# Patient Record
Sex: MALE | Race: WHITE | NOT HISPANIC OR LATINO | Employment: OTHER | URBAN - METROPOLITAN AREA
[De-identification: names, ages, dates, MRNs, and addresses within clinical notes are randomized per-mention and may not be internally consistent; named-entity substitution may affect disease eponyms.]

---

## 2017-01-10 ENCOUNTER — HOSPITAL ENCOUNTER (OUTPATIENT)
Dept: CARDIOLOGY | Facility: HOSPITAL | Age: 77
Discharge: HOME | End: 2017-01-10
Attending: INTERNAL MEDICINE | Admitting: INTERNAL MEDICINE

## 2019-07-26 RX ORDER — METOPROLOL SUCCINATE 25 MG/1
TABLET, EXTENDED RELEASE ORAL
Qty: 90 TABLET | Refills: 2 | Status: SHIPPED | OUTPATIENT
Start: 2019-07-26 | End: 2020-04-24

## 2019-07-26 RX ORDER — RAMIPRIL 10 MG/1
CAPSULE ORAL
Qty: 90 CAPSULE | Refills: 2 | Status: SHIPPED | OUTPATIENT
Start: 2019-07-26 | End: 2020-03-16

## 2019-08-23 RX ORDER — ROSUVASTATIN CALCIUM 10 MG/1
TABLET, COATED ORAL
COMMUNITY
Start: 2015-06-30

## 2019-08-23 RX ORDER — ISOSORBIDE MONONITRATE 30 MG/1
TABLET, EXTENDED RELEASE ORAL EVERY 24 HOURS
COMMUNITY
Start: 2017-12-06

## 2019-08-23 RX ORDER — LEVOTHYROXINE SODIUM 20 UG/ML
INJECTION, SOLUTION INTRAVENOUS
COMMUNITY
Start: 2014-04-01 | End: 2021-08-31

## 2019-08-23 RX ORDER — AMPICILLIN TRIHYDRATE 250 MG
CAPSULE ORAL
COMMUNITY
Start: 2017-01-05

## 2019-08-30 ENCOUNTER — OFFICE VISIT (OUTPATIENT)
Dept: CARDIOLOGY | Facility: CLINIC | Age: 79
End: 2019-08-30

## 2019-08-30 VITALS
OXYGEN SATURATION: 99 % | DIASTOLIC BLOOD PRESSURE: 80 MMHG | BODY MASS INDEX: 25.43 KG/M2 | HEART RATE: 72 BPM | HEIGHT: 67 IN | SYSTOLIC BLOOD PRESSURE: 125 MMHG | WEIGHT: 162 LBS

## 2019-08-30 DIAGNOSIS — I10 ESSENTIAL HYPERTENSION: ICD-10-CM

## 2019-08-30 DIAGNOSIS — E78.5 DYSLIPIDEMIA: Primary | ICD-10-CM

## 2019-08-30 DIAGNOSIS — R07.89 CHEST PAIN, ATYPICAL: ICD-10-CM

## 2019-08-30 DIAGNOSIS — I25.10 CAD S/P PERCUTANEOUS CORONARY ANGIOPLASTY: ICD-10-CM

## 2019-08-30 DIAGNOSIS — Z98.61 CAD S/P PERCUTANEOUS CORONARY ANGIOPLASTY: ICD-10-CM

## 2019-08-30 DIAGNOSIS — R74.8 ELEVATED CPK: ICD-10-CM

## 2019-08-30 PROCEDURE — 93000 ELECTROCARDIOGRAM COMPLETE: CPT | Performed by: INTERNAL MEDICINE

## 2019-08-30 PROCEDURE — 99214 OFFICE O/P EST MOD 30 MIN: CPT | Performed by: INTERNAL MEDICINE

## 2019-08-30 RX ORDER — ZOSTER VACCINE RECOMBINANT, ADJUVANTED 50 MCG/0.5
KIT INTRAMUSCULAR
COMMUNITY
Start: 2019-08-22 | End: 2021-08-31

## 2019-08-30 RX ORDER — INFLUENZA VACCINE, ADJUVANTED 15; 15; 15 UG/.5ML; UG/.5ML; UG/.5ML
INJECTION, SUSPENSION INTRAMUSCULAR
Refills: 0 | COMMUNITY
Start: 2019-08-24 | End: 2021-08-31

## 2019-08-30 RX ORDER — PNEUMOCOCCAL VACCINE POLYVALENT 25; 25; 25; 25; 25; 25; 25; 25; 25; 25; 25; 25; 25; 25; 25; 25; 25; 25; 25; 25; 25; 25; 25 UG/.5ML; UG/.5ML; UG/.5ML; UG/.5ML; UG/.5ML; UG/.5ML; UG/.5ML; UG/.5ML; UG/.5ML; UG/.5ML; UG/.5ML; UG/.5ML; UG/.5ML; UG/.5ML; UG/.5ML; UG/.5ML; UG/.5ML; UG/.5ML; UG/.5ML; UG/.5ML; UG/.5ML; UG/.5ML; UG/.5ML
INJECTION, SOLUTION INTRAMUSCULAR; SUBCUTANEOUS
Refills: 0 | COMMUNITY
Start: 2019-08-24 | End: 2021-08-31

## 2020-03-16 RX ORDER — RAMIPRIL 10 MG/1
CAPSULE ORAL
Qty: 90 CAPSULE | Refills: 1 | Status: SHIPPED | OUTPATIENT
Start: 2020-03-16 | End: 2020-08-13

## 2020-04-24 RX ORDER — METOPROLOL SUCCINATE 25 MG/1
TABLET, EXTENDED RELEASE ORAL
Qty: 90 TABLET | Refills: 2 | Status: SHIPPED | OUTPATIENT
Start: 2020-04-24 | End: 2021-01-19

## 2020-07-28 ENCOUNTER — TELEPHONE (OUTPATIENT)
Dept: CARDIOLOGY | Facility: CLINIC | Age: 80
End: 2020-07-28

## 2020-07-28 DIAGNOSIS — I25.10 CAD S/P PERCUTANEOUS CORONARY ANGIOPLASTY: ICD-10-CM

## 2020-07-28 DIAGNOSIS — E78.5 DYSLIPIDEMIA: ICD-10-CM

## 2020-07-28 DIAGNOSIS — Z98.61 CAD S/P PERCUTANEOUS CORONARY ANGIOPLASTY: ICD-10-CM

## 2020-07-28 DIAGNOSIS — I10 ESSENTIAL HYPERTENSION: ICD-10-CM

## 2020-08-13 RX ORDER — RAMIPRIL 10 MG/1
CAPSULE ORAL
Qty: 30 CAPSULE | Refills: 0 | Status: SHIPPED | OUTPATIENT
Start: 2020-08-13 | End: 2020-09-03 | Stop reason: SDUPTHER

## 2020-08-31 ENCOUNTER — OFFICE VISIT (OUTPATIENT)
Dept: CARDIOLOGY | Facility: CLINIC | Age: 80
End: 2020-08-31

## 2020-08-31 VITALS
HEART RATE: 74 BPM | HEIGHT: 67 IN | OXYGEN SATURATION: 97 % | SYSTOLIC BLOOD PRESSURE: 106 MMHG | BODY MASS INDEX: 24.33 KG/M2 | DIASTOLIC BLOOD PRESSURE: 65 MMHG | WEIGHT: 155 LBS

## 2020-08-31 DIAGNOSIS — Z98.61 CAD S/P PERCUTANEOUS CORONARY ANGIOPLASTY: ICD-10-CM

## 2020-08-31 DIAGNOSIS — N28.9 RENAL INSUFFICIENCY: Primary | ICD-10-CM

## 2020-08-31 DIAGNOSIS — I95.2 HYPOTENSION DUE TO DRUGS: ICD-10-CM

## 2020-08-31 DIAGNOSIS — I10 ESSENTIAL HYPERTENSION: ICD-10-CM

## 2020-08-31 DIAGNOSIS — M19.019 OSTEOARTHRITIS OF SHOULDER, UNSPECIFIED LATERALITY, UNSPECIFIED OSTEOARTHRITIS TYPE: ICD-10-CM

## 2020-08-31 DIAGNOSIS — R42 DIZZINESS: ICD-10-CM

## 2020-08-31 DIAGNOSIS — I25.10 CAD S/P PERCUTANEOUS CORONARY ANGIOPLASTY: ICD-10-CM

## 2020-08-31 DIAGNOSIS — E78.5 DYSLIPIDEMIA: ICD-10-CM

## 2020-08-31 DIAGNOSIS — R55 VASOVAGAL SYNCOPE: ICD-10-CM

## 2020-08-31 PROCEDURE — 99215 OFFICE O/P EST HI 40 MIN: CPT | Performed by: INTERNAL MEDICINE

## 2020-08-31 PROCEDURE — 93000 ELECTROCARDIOGRAM COMPLETE: CPT | Performed by: INTERNAL MEDICINE

## 2020-08-31 RX ORDER — LEVOTHYROXINE SODIUM 0.1 MG/1
100 TABLET ORAL DAILY
COMMUNITY
Start: 2020-08-13

## 2020-08-31 RX ORDER — CELECOXIB 200 MG/1
200 CAPSULE ORAL DAILY
COMMUNITY
Start: 2020-08-22 | End: 2021-08-31

## 2020-08-31 RX ORDER — SENNOSIDES 8.6 MG
650 CAPSULE ORAL EVERY 8 HOURS PRN
COMMUNITY

## 2020-09-03 RX ORDER — RAMIPRIL 5 MG/1
10 CAPSULE ORAL DAILY
Qty: 90 CAPSULE | Refills: 3 | Status: SHIPPED | OUTPATIENT
Start: 2020-09-03 | End: 2020-09-11

## 2020-09-11 ENCOUNTER — TELEPHONE (OUTPATIENT)
Dept: CARDIOLOGY | Facility: CLINIC | Age: 80
End: 2020-09-11

## 2020-09-11 RX ORDER — RAMIPRIL 5 MG/1
5 CAPSULE ORAL DAILY
Qty: 90 CAPSULE | Refills: 3
Start: 2020-09-11 | End: 2020-09-24 | Stop reason: SDUPTHER

## 2020-09-11 NOTE — TELEPHONE ENCOUNTER
Pt was referred to Nephrology at his last visit. He has not heard from anyone about an appointment.       Secondly, Ramipril was cut in half at his last visit, he should be taking 5 mg daily.

## 2020-09-22 NOTE — TELEPHONE ENCOUNTER
Gave patient number to Dr. Snow. Advised he call their office to make apt.  Routing back because I'm not sure if the medication issue has been addressed?

## 2020-09-24 RX ORDER — RAMIPRIL 5 MG/1
5 CAPSULE ORAL DAILY
Qty: 90 CAPSULE | Refills: 3 | Status: SHIPPED | OUTPATIENT
Start: 2020-09-24 | End: 2020-10-16

## 2020-10-16 RX ORDER — RAMIPRIL 5 MG/1
CAPSULE ORAL
Qty: 90 CAPSULE | Refills: 3 | Status: SHIPPED | OUTPATIENT
Start: 2020-10-16 | End: 2021-08-31 | Stop reason: SDUPTHER

## 2020-11-23 ENCOUNTER — TRANSCRIBE ORDERS (OUTPATIENT)
Dept: ADMINISTRATIVE | Facility: HOSPITAL | Age: 80
End: 2020-11-23

## 2020-11-23 DIAGNOSIS — G57.93 NEUROPATHIC PAIN, LEG, BILATERAL: Primary | ICD-10-CM

## 2020-12-18 ENCOUNTER — HOSPITAL ENCOUNTER (OUTPATIENT)
Dept: NEUROLOGY | Facility: HOSPITAL | Age: 80
Discharge: HOME | End: 2020-12-18
Admitting: NURSE PRACTITIONER

## 2020-12-18 DIAGNOSIS — G57.93 NEUROPATHIC PAIN, LEG, BILATERAL: ICD-10-CM

## 2020-12-18 PROCEDURE — 95909 NRV CNDJ TST 5-6 STUDIES: CPT

## 2020-12-18 PROCEDURE — 95886 MUSC TEST DONE W/N TEST COMP: CPT

## 2020-12-18 PROCEDURE — 95886 MUSC TEST DONE W/N TEST COMP: CPT | Performed by: PSYCHIATRY & NEUROLOGY

## 2020-12-18 PROCEDURE — 95909 NRV CNDJ TST 5-6 STUDIES: CPT | Performed by: PSYCHIATRY & NEUROLOGY

## 2021-01-19 RX ORDER — METOPROLOL SUCCINATE 25 MG/1
TABLET, EXTENDED RELEASE ORAL
Qty: 90 TABLET | Refills: 2 | Status: SHIPPED | OUTPATIENT
Start: 2021-01-19 | End: 2021-10-11

## 2021-01-22 ENCOUNTER — TELEPHONE (OUTPATIENT)
Dept: CARDIOLOGY | Facility: CLINIC | Age: 81
End: 2021-01-22

## 2021-08-24 ENCOUNTER — TELEPHONE (OUTPATIENT)
Dept: CARDIOLOGY | Facility: CLINIC | Age: 81
End: 2021-08-24

## 2021-08-24 NOTE — TELEPHONE ENCOUNTER
Pt contacted regarding upcoming appt with Dr. Rehman. He will complete labs at [facility]. Orders faxed.

## 2021-08-31 ENCOUNTER — OFFICE VISIT (OUTPATIENT)
Dept: CARDIOLOGY | Facility: CLINIC | Age: 81
End: 2021-08-31

## 2021-08-31 VITALS
DIASTOLIC BLOOD PRESSURE: 67 MMHG | SYSTOLIC BLOOD PRESSURE: 122 MMHG | HEIGHT: 67 IN | OXYGEN SATURATION: 97 % | WEIGHT: 159 LBS | HEART RATE: 66 BPM | BODY MASS INDEX: 24.96 KG/M2

## 2021-08-31 DIAGNOSIS — I95.2 HYPOTENSION DUE TO DRUGS: Primary | ICD-10-CM

## 2021-08-31 DIAGNOSIS — I25.10 CAD S/P PERCUTANEOUS CORONARY ANGIOPLASTY: ICD-10-CM

## 2021-08-31 DIAGNOSIS — I10 ESSENTIAL HYPERTENSION: ICD-10-CM

## 2021-08-31 DIAGNOSIS — E78.5 DYSLIPIDEMIA: ICD-10-CM

## 2021-08-31 DIAGNOSIS — Z98.61 CAD S/P PERCUTANEOUS CORONARY ANGIOPLASTY: ICD-10-CM

## 2021-08-31 PROCEDURE — 99214 OFFICE O/P EST MOD 30 MIN: CPT | Performed by: INTERNAL MEDICINE

## 2021-08-31 PROCEDURE — 93000 ELECTROCARDIOGRAM COMPLETE: CPT | Performed by: INTERNAL MEDICINE

## 2021-08-31 RX ORDER — RAMIPRIL 2.5 MG/1
10 CAPSULE ORAL DAILY
Qty: 90 CAPSULE | Refills: 3 | Status: SHIPPED | OUTPATIENT
Start: 2021-08-31 | End: 2021-09-30 | Stop reason: SDUPTHER

## 2021-08-31 RX ORDER — TAMSULOSIN HYDROCHLORIDE 0.4 MG/1
1 CAPSULE ORAL DAILY
COMMUNITY

## 2021-08-31 RX ORDER — BETAMETHASONE DIPROPIONATE 0.5 MG/G
CREAM TOPICAL 2 TIMES DAILY
COMMUNITY

## 2021-08-31 NOTE — PROGRESS NOTES
Subjective:     Encounter Date:08/31/2021      Patient ID: Emigdio Maldonado is a 81 y.o. male.    Chief Complaint : Follow-up for CAD, PCI, hypertension, dyslipidemia  History of Present Illness        This is a 81-year-old white male with PMH of    #. CAD,f SANDEEP to RCA in Dec 2012, cath 1/18/17  severe right PDA, mid to distal diffuse LAD, distal LCX AV branch disease  #. hypertension,   #  dyslipidemia  #.  DJD,arthritis,   # hypothyroidism  #. CKD  #. Former smoker  #.  intolerant to Lipitor due to leg crampsIn the past, now tolerating Crestor okay     here for Followup.  Patient denies any chest pain, shortness of breath, palpitations.    Patient's arterial blood pressure is 122/67, heart rate 60, O2 sat of 97% on room air.  Patient states has been noticing low blood pressure at home and when he gets up or bends down sometimes he gets dizzy.  Denies any loss of consciousness.  Patient had labs done 08/23/2018 which revealed normal CMP, CK, HDL low at point LDL 87 total cholesterol 155 triglycerides 140.  Repeat labs from PMD from 8/23/2019 reveals CPK which is elevated at 318, CMP with a BUN of 32 creatinine 1.16, cholesterol 147 triglycerides 137, HDL 41 LDL 79.  Labs from 7/31/2020 reveal CMP with BUN/creatinine of 41/1.24.  CPK normal at 154, cholesterol 132 triglycerides 98 HDL 42 LDL 70, normal TSH.  Labs from 8/26/2021 reveal CMP with a BUN/creatinine of 36/1.2 GFR 57, normal CK.  Cholesterol 153 triglycerides 134, HDL 41, LDL 85.     ASSESSMENT:  #Lightheadedness, dizziness, hypotension  #Vasovagal syncope  #History of renal insufficiency  #Dyslipidemia with low HDL  #CAD history of PCI  #.hypertension     PLAN:  Reviewed EKG results with patient  Patient is complaining of lightheadedness dizziness and his blood pressure is borderline and has developed increased creatinine will cut ramipril in half.  Follow-up with nephrology.  Continue aspirin, isosorbide, metoprolol, rosuvastatin to help with  hypertension, dyslipidemia, CAD history of PCI as tolerated.  Counseled on walking exercise for low HDL.  Discussed about COVID-19 vaccination.  Patient took both the doses.          ECG 12 Lead    Date/Time: 8/31/2021 5:39 PM  Performed by: Robert Rehman MD  Authorized by: Robert Rehman MD   Comparison: compared with previous ECG from 8/31/2020  Comparison to previous ECG: EKG done today reviewed/interpreted by me reveals sinus bradycardia with rate of 59 bpm with no acute ST-T changes, compared to EKG from 8/31/2020 heart rate is slower.              The following portions of the patient's history were reviewed and updated as appropriate: allergies, current medications, past family history, past medical history, past social history, past surgical history and problem list.    Assessment:         MDM     Diagnosis Plan   1. Hypotension due to drugs  Comprehensive Metabolic Panel    Lipid Panel   2. Essential hypertension  Comprehensive Metabolic Panel    Lipid Panel   3. CAD S/P percutaneous coronary angioplasty  Comprehensive Metabolic Panel    Lipid Panel   4. Dyslipidemia  Comprehensive Metabolic Panel    Lipid Panel          Plan:               Past Medical History:  Past Medical History:   Diagnosis Date   • Arthritis    • CAD (coronary artery disease)    • DJD (degenerative joint disease)    • Hypertension    • Hypothyroidism      Past Surgical History:  Past Surgical History:   Procedure Laterality Date   • CARDIAC CATHETERIZATION     • CORONARY ANGIOPLASTY WITH STENT PLACEMENT        Allergies:  No Known Allergies  Home Meds:  Current Meds:     Current Outpatient Medications:   •  acetaminophen (TYLENOL) 650 MG 8 hr tablet, Take 650 mg by mouth Every 8 (Eight) Hours As Needed for Mild Pain ., Disp: , Rfl:   •  aspirin 81 MG tablet, ASPIRIN 81 MG ORAL TABLET, Disp: , Rfl:   •  betamethasone dipropionate 0.05 % cream, Apply  topically to the appropriate area as directed 2 (Two) Times a  "Day., Disp: , Rfl:   •  Coenzyme Q10 (COQ10) 200 MG capsule, COQ10 100 MG CAPS, Disp: , Rfl:   •  isosorbide mononitrate (IMDUR) 30 MG 24 hr tablet, Daily., Disp: , Rfl:   •  levothyroxine (SYNTHROID, LEVOTHROID) 100 MCG tablet, Take 100 mcg by mouth Daily., Disp: , Rfl:   •  metoprolol succinate XL (TOPROL-XL) 25 MG 24 hr tablet, TAKE 1 TABLET BY MOUTH EVERY DAY, Disp: 90 tablet, Rfl: 2  •  ramipril (ALTACE) 2.5 MG capsule, Take 4 capsules by mouth Daily., Disp: 90 capsule, Rfl: 3  •  rosuvastatin (CRESTOR) 10 MG tablet, ROSUVASTATIN CALCIUM 10 MG TABS, Disp: , Rfl:   •  tamsulosin (FLOMAX) 0.4 MG capsule 24 hr capsule, Take 1 capsule by mouth Daily., Disp: , Rfl:   Social History:   Social History     Tobacco Use   • Smoking status: Former Smoker     Types: Cigars   • Smokeless tobacco: Never Used   Substance Use Topics   • Alcohol use: Yes      Family History:  Family History   Problem Relation Age of Onset   • No Known Problems Mother    • No Known Problems Father               Review of Systems   Constitutional: Negative for malaise/fatigue.   Cardiovascular: Negative for chest pain, leg swelling and palpitations.   Respiratory: Negative for shortness of breath.    Skin: Negative for rash.   Neurological: Positive for dizziness and light-headedness. Negative for numbness.     All other systems are negative         Objective:     Physical Exam  /67   Pulse 66   Ht 170.2 cm (67\")   Wt 72.1 kg (159 lb)   SpO2 97%   BMI 24.90 kg/m²   General:  Appears in no acute distress  Eyes: Sclera is anicteric,  conjunctiva is clear   HEENT:  No JVD.  No carotid bruits  Respiratory: Respirations regular and unlabored at rest.  Clear to auscultation  Cardiovascular: S1,S2 Regular rate and rhythm. No murmur, rub or gallop auscultated.   Extremities: No digital clubbing or cyanosis, no edema  Skin: Color pink. Skin warm and dry to touch. No rashes  No xanthoma  Neuro: Alert and awake, no lateralizing deficits " appreciated    Lab Reviewed:

## 2021-09-30 ENCOUNTER — TELEPHONE (OUTPATIENT)
Dept: CARDIOLOGY | Facility: CLINIC | Age: 81
End: 2021-09-30

## 2021-09-30 RX ORDER — RAMIPRIL 2.5 MG/1
2.5 CAPSULE ORAL DAILY
COMMUNITY
End: 2022-12-21

## 2021-09-30 NOTE — TELEPHONE ENCOUNTER
"----- Message from Emigdio Maldonado sent at 9/29/2021  4:59 PM EDT -----  Regarding: Visit Follow-Up Question  Contact: [phone]  1.  Here are some blood pressure readings that you requested.  I used an arm-cuff machine at my local [facility].  Readings obtained forenoon of September 16 and for next 4 days at approximately the same time.  1) 116/67,    2) 109/60,    3) 97/61,    4) 105/71,   and  5) 118/74.  2. Why does my new prescription for ramipril 2.5 mg capsule say \"take 4 capsules by mouth every day\"?  If we are reducing this medication from 5 mg to 2.5 mg, shouldn't I only take 1 capsule per day?  "

## 2021-10-11 RX ORDER — METOPROLOL SUCCINATE 25 MG/1
TABLET, EXTENDED RELEASE ORAL
Qty: 90 TABLET | Refills: 3 | Status: SHIPPED | OUTPATIENT
Start: 2021-10-11 | End: 2022-09-28

## 2021-10-11 RX ORDER — RAMIPRIL 2.5 MG/1
2.5 CAPSULE ORAL DAILY
Qty: 90 CAPSULE | Refills: 3 | Status: SHIPPED | OUTPATIENT
Start: 2021-10-11 | End: 2022-09-13 | Stop reason: SDUPTHER

## 2021-11-17 ENCOUNTER — TELEPHONE (OUTPATIENT)
Dept: CARDIOLOGY | Facility: CLINIC | Age: 81
End: 2021-11-17

## 2022-09-08 ENCOUNTER — TELEPHONE (OUTPATIENT)
Dept: CARDIOLOGY | Facility: CLINIC | Age: 82
End: 2022-09-08

## 2022-09-08 DIAGNOSIS — Z98.61 CAD S/P PERCUTANEOUS CORONARY ANGIOPLASTY: ICD-10-CM

## 2022-09-08 DIAGNOSIS — E78.5 DYSLIPIDEMIA: ICD-10-CM

## 2022-09-08 DIAGNOSIS — I25.10 CAD S/P PERCUTANEOUS CORONARY ANGIOPLASTY: ICD-10-CM

## 2022-09-08 DIAGNOSIS — I10 ESSENTIAL HYPERTENSION: Primary | ICD-10-CM

## 2022-09-08 NOTE — TELEPHONE ENCOUNTER
Caller: Emigdio Maldonado    Relationship: Self    Best call back number: [phone]    What orders are you requesting (i.e. lab or imaging): LABWORK    In what timeframe would the patient need to come in: ASAP     Where will you receive your lab/imaging services: [facility]    Additional notes: PT REPORTS HE USUALLY GETS LABS FOR HIS APPTS WITH DR WORTHINGTON AND HADNT HEARD ANYTHING - PT WANTING TO HAVE THEM COMPLETED BEFORE APPT 9.13.22 IF POSSIBLE

## 2022-09-09 DIAGNOSIS — Z98.61 CAD S/P PERCUTANEOUS CORONARY ANGIOPLASTY: ICD-10-CM

## 2022-09-09 DIAGNOSIS — I10 ESSENTIAL HYPERTENSION: ICD-10-CM

## 2022-09-09 DIAGNOSIS — I25.10 CAD S/P PERCUTANEOUS CORONARY ANGIOPLASTY: ICD-10-CM

## 2022-09-09 DIAGNOSIS — E78.5 DYSLIPIDEMIA: ICD-10-CM

## 2022-09-12 DIAGNOSIS — I10 ESSENTIAL HYPERTENSION: Primary | ICD-10-CM

## 2022-09-12 DIAGNOSIS — E78.5 DYSLIPIDEMIA: ICD-10-CM

## 2022-09-12 DIAGNOSIS — I10 ESSENTIAL HYPERTENSION: ICD-10-CM

## 2022-09-13 ENCOUNTER — OFFICE VISIT (OUTPATIENT)
Dept: CARDIOLOGY | Facility: CLINIC | Age: 82
End: 2022-09-13

## 2022-09-13 VITALS
HEIGHT: 66 IN | WEIGHT: 162 LBS | SYSTOLIC BLOOD PRESSURE: 144 MMHG | BODY MASS INDEX: 26.03 KG/M2 | DIASTOLIC BLOOD PRESSURE: 67 MMHG | OXYGEN SATURATION: 100 % | HEART RATE: 69 BPM

## 2022-09-13 DIAGNOSIS — Z98.61 CAD S/P PERCUTANEOUS CORONARY ANGIOPLASTY: Primary | ICD-10-CM

## 2022-09-13 DIAGNOSIS — I25.10 CAD S/P PERCUTANEOUS CORONARY ANGIOPLASTY: Primary | ICD-10-CM

## 2022-09-13 DIAGNOSIS — N28.9 RENAL INSUFFICIENCY: ICD-10-CM

## 2022-09-13 DIAGNOSIS — E78.5 DYSLIPIDEMIA: ICD-10-CM

## 2022-09-13 DIAGNOSIS — I10 ESSENTIAL HYPERTENSION: ICD-10-CM

## 2022-09-13 PROCEDURE — 93000 ELECTROCARDIOGRAM COMPLETE: CPT | Performed by: INTERNAL MEDICINE

## 2022-09-13 PROCEDURE — 99214 OFFICE O/P EST MOD 30 MIN: CPT | Performed by: INTERNAL MEDICINE

## 2022-09-13 NOTE — PROGRESS NOTES
Subjective:     Encounter Date:09/13/2022      Patient ID: Emigdio Maldonado is a 82 y.o. male.    Chief Complaint : Follow-up for CAD, PCI, hypertension, dyslipidemia    History of Present Illness      Mr. Emigdio Maldnoado has PMH of    #. CAD, SANDEEP to RCA in Dec 2012, cath 1/18/17  severe right PDA, mid to distal diffuse LAD, distal LCX AV branch disease  #. hypertension,   #  Dyslipidemia  #.  History of vasovagal syncope  #.  CKD 3  #.  DJD,arthritis,   # hypothyroidism  #. CKD  #. Former smoker  #.  intolerant to Lipitor due to leg crampsIn the past, now tolerating Crestor okay     here for Followup.  Patient denies any chest pain, shortness of breath, palpitations.     Patient's arterial blood pressure is 144/67, heart rate 69, O2 sat 100% on room air.  Patient states has been noticing low blood pressure at home and when he gets up or bends down sometimes he gets dizzy.  Denies any loss of consciousness.    Patient had labs done 08/23/2018 which revealed normal CMP, CK, HDL low at point LDL 87 total cholesterol 155 triglycerides 140.  Repeat labs from PMD from 8/23/2019 reveals CPK which is elevated at 318, CMP with a BUN of 32 creatinine 1.16, cholesterol 147 triglycerides 137, HDL 41 LDL 79.  Labs from 7/31/2020 reveal CMP with BUN/creatinine of 41/1.24.  CPK normal at 154, cholesterol 132 triglycerides 98 HDL 42 LDL 70, normal TSH.  Labs from 8/26/2021 reveal CMP with a BUN/creatinine of 36/1.2 GFR 57, normal CK.  Cholesterol 153 triglycerides 134, HDL 41, LDL 85.  Labs from 9/9/2022 reveal CMP with a creatinine of 1.26 GFR of 54.     ASSESSMENT:      #Dyslipidemia with low HDL  #CAD history of PCI  #.hypertension     PLAN:    Reviewed EKG results with patient  Reviewed labs with patient  Follow-up with nephrology.  Continue aspirin, isosorbide, metoprolol, rosuvastatin to help with hypertension, dyslipidemia, CAD history of PCI as tolerated.  Counseled on walking exercise for low HDL.  We will check labs  before next visit        ECG 12 Lead    Date/Time: 9/13/2022 10:35 AM  Performed by: Robert Rehman MD  Authorized by: Robert Rehman MD   Comparison: compared with previous ECG from 8/31/2021  Comparison to previous ECG: EKG done today reviewed/interpreted by me reveals sinus rhythm with rate of 62 bpm, no new change compared EKG from 8/31/2021              Copied text in this portion of the note has been reviewed and is accurate as of 9/13/2022  The following portions of the patient's history were reviewed and updated as appropriate: allergies, current medications, past family history, past medical history, past social history, past surgical history and problem list.    Assessment:         MDM     Diagnosis Plan   1. CAD S/P percutaneous coronary angioplasty  ECG 12 Lead    Comprehensive Metabolic Panel    Lipid Panel   2. Essential hypertension  ECG 12 Lead    Comprehensive Metabolic Panel    Lipid Panel   3. Dyslipidemia  ECG 12 Lead    Comprehensive Metabolic Panel    Lipid Panel   4. Renal insufficiency  ECG 12 Lead    Comprehensive Metabolic Panel    Lipid Panel          Plan:               Past Medical History:  Past Medical History:   Diagnosis Date   • Arthritis    • CAD (coronary artery disease)    • DJD (degenerative joint disease)    • Hypertension    • Hypothyroidism      Past Surgical History:  Past Surgical History:   Procedure Laterality Date   • CARDIAC CATHETERIZATION     • CORONARY ANGIOPLASTY WITH STENT PLACEMENT        Allergies:  No Known Allergies  Home Meds:  Current Meds:     Current Outpatient Medications:   •  acetaminophen (TYLENOL) 650 MG 8 hr tablet, Take 650 mg by mouth Every 8 (Eight) Hours As Needed for Mild Pain ., Disp: , Rfl:   •  aspirin 81 MG tablet, ASPIRIN 81 MG ORAL TABLET, Disp: , Rfl:   •  Coenzyme Q10 (COQ10) 200 MG capsule, COQ10 100 MG CAPS, Disp: , Rfl:   •  isosorbide mononitrate (IMDUR) 30 MG 24 hr tablet, Daily., Disp: , Rfl:   •  levothyroxine  "(SYNTHROID, LEVOTHROID) 100 MCG tablet, Take 100 mcg by mouth Daily., Disp: , Rfl:   •  metoprolol succinate XL (TOPROL-XL) 25 MG 24 hr tablet, TAKE 1 TABLET BY MOUTH EVERY DAY, Disp: 90 tablet, Rfl: 3  •  ramipril (ALTACE) 2.5 MG capsule, Take 2.5 mg by mouth Daily., Disp: , Rfl:   •  rosuvastatin (CRESTOR) 10 MG tablet, ROSUVASTATIN CALCIUM 10 MG TABS, Disp: , Rfl:   •  tamsulosin (FLOMAX) 0.4 MG capsule 24 hr capsule, Take 1 capsule by mouth Daily., Disp: , Rfl:   •  betamethasone dipropionate 0.05 % cream, Apply  topically to the appropriate area as directed 2 (Two) Times a Day., Disp: , Rfl:   Social History:   Social History     Tobacco Use   • Smoking status: Former Smoker     Years: 9.00     Types: Pipe     Start date: 1968     Quit date: 1976     Years since quittin.7   • Smokeless tobacco: Never Used   Substance Use Topics   • Alcohol use: Yes      Family History:  Family History   Problem Relation Age of Onset   • No Known Problems Mother    • No Known Problems Father               Review of Systems   Constitutional: Negative for malaise/fatigue.   Cardiovascular: Negative for chest pain, leg swelling and palpitations.   Respiratory: Negative for shortness of breath.    Skin: Negative for rash.   Neurological: Negative for dizziness, light-headedness and numbness.     All other systems are negative         Objective:     Physical Exam  /67   Pulse 69   Ht 167.6 cm (66\")   Wt 73.5 kg (162 lb)   SpO2 100%   BMI 26.15 kg/m²   General:  Appears in no acute distress  Eyes: Sclera is anicteric,  conjunctiva is clear   HEENT:  No JVD.  No carotid bruits  Respiratory: Respirations regular and unlabored at rest.  Clear to auscultation  Cardiovascular: S1,S2 Regular rate and rhythm. No murmur, rub or gallop auscultated.   Extremities: No digital clubbing or cyanosis, no edema  Skin: Color pink. Skin warm and dry to touch. No rashes  No xanthoma  Neuro: Alert and awake.    Lab Reviewed:     " "    Robert Rehman MD  9/13/2022 10:41 EDT      EMR Dragon/Transcription:   \"Dictated utilizing Dragon dictation\".        "

## 2022-09-28 RX ORDER — METOPROLOL SUCCINATE 25 MG/1
TABLET, EXTENDED RELEASE ORAL
Qty: 90 TABLET | Refills: 3 | Status: SHIPPED | OUTPATIENT
Start: 2022-09-28

## 2022-09-28 NOTE — TELEPHONE ENCOUNTER
Rx Refill Note  Requested Prescriptions     Pending Prescriptions Disp Refills   • metoprolol succinate XL (TOPROL-XL) 25 MG 24 hr tablet [Pharmacy Med Name: METOPROLOL SUCC ER 25 MG TAB] 90 tablet 3     Sig: TAKE 1 TABLET BY MOUTH EVERY DAY      Last office visit with prescribing clinician: 9/13/2022      Next office visit with prescribing clinician: 9/13/2023            Vania Long MA  09/28/22, 09:00 EDT

## 2022-12-21 RX ORDER — RAMIPRIL 2.5 MG/1
CAPSULE ORAL
Qty: 90 CAPSULE | Refills: 3 | Status: SHIPPED | OUTPATIENT
Start: 2022-12-21

## 2022-12-21 NOTE — TELEPHONE ENCOUNTER
Rx Refill Note  Requested Prescriptions     Pending Prescriptions Disp Refills   • ramipril (ALTACE) 2.5 MG capsule [Pharmacy Med Name: RAMIPRIL 2.5 MG CAPSULE] 90 capsule 3     Sig: TAKE 1 CAPSULE BY MOUTH EVERY DAY      Last office visit with prescribing clinician: 9/13/2022   Last telemedicine visit with prescribing clinician: Visit date not found   Next office visit with prescribing clinician: 9/13/2023                         Would you like a call back once the refill request has been completed: [] Yes [] No    If the office needs to give you a call back, can they leave a voicemail: [] Yes [] No    Vania Long MA  12/21/22, 14:17 EST

## 2023-07-27 RX ORDER — METOPROLOL SUCCINATE 25 MG/1
TABLET, EXTENDED RELEASE ORAL
Qty: 90 TABLET | Refills: 0 | Status: SHIPPED | OUTPATIENT
Start: 2023-07-27

## 2023-07-27 NOTE — TELEPHONE ENCOUNTER
Rx Refill Note  Requested Prescriptions     Pending Prescriptions Disp Refills    metoprolol succinate XL (TOPROL-XL) 25 MG 24 hr tablet [Pharmacy Med Name: METOPROLOL SUCC ER 25 MG TAB] 90 tablet 3     Sig: TAKE 1 TABLET BY MOUTH EVERY DAY      Last office visit with prescribing clinician: 9/13/2022   Last telemedicine visit with prescribing clinician: Visit date not found   Next office visit with prescribing clinician: 9/13/2023                         Would you like a call back once the refill request has been completed: [] Yes [] No    If the office needs to give you a call back, can they leave a voicemail: [] Yes [] No    Vania Long MA  07/27/23, 09:24 EDT

## 2023-08-31 ENCOUNTER — TELEPHONE (OUTPATIENT)
Dept: CARDIOLOGY | Facility: CLINIC | Age: 83
End: 2023-08-31
Payer: MEDICARE

## 2023-08-31 NOTE — TELEPHONE ENCOUNTER
SCANNED - LABS (07/27/2023)   SCANNED - LABS (07/27/2023)     Pt made aware that we received labs from 7/2023, he does not need further labs.

## 2023-09-13 ENCOUNTER — OFFICE VISIT (OUTPATIENT)
Dept: CARDIOLOGY | Facility: CLINIC | Age: 83
End: 2023-09-13
Payer: MEDICARE

## 2023-09-13 VITALS
OXYGEN SATURATION: 98 % | SYSTOLIC BLOOD PRESSURE: 118 MMHG | DIASTOLIC BLOOD PRESSURE: 54 MMHG | HEART RATE: 64 BPM | BODY MASS INDEX: 25.88 KG/M2 | HEIGHT: 66 IN | WEIGHT: 161 LBS

## 2023-09-13 DIAGNOSIS — Z98.61 CAD S/P PERCUTANEOUS CORONARY ANGIOPLASTY: Primary | ICD-10-CM

## 2023-09-13 DIAGNOSIS — N28.9 RENAL INSUFFICIENCY: ICD-10-CM

## 2023-09-13 DIAGNOSIS — E78.5 DYSLIPIDEMIA: ICD-10-CM

## 2023-09-13 DIAGNOSIS — I25.10 CAD S/P PERCUTANEOUS CORONARY ANGIOPLASTY: Primary | ICD-10-CM

## 2023-09-13 DIAGNOSIS — I10 ESSENTIAL HYPERTENSION: ICD-10-CM

## 2023-09-13 PROBLEM — R94.39 ABNORMAL CARDIOVASCULAR STRESS TEST: Status: ACTIVE | Noted: 2017-01-10

## 2023-09-13 PROCEDURE — 3078F DIAST BP <80 MM HG: CPT | Performed by: INTERNAL MEDICINE

## 2023-09-13 PROCEDURE — 93000 ELECTROCARDIOGRAM COMPLETE: CPT | Performed by: INTERNAL MEDICINE

## 2023-09-13 PROCEDURE — 3074F SYST BP LT 130 MM HG: CPT | Performed by: INTERNAL MEDICINE

## 2023-09-13 PROCEDURE — 1159F MED LIST DOCD IN RCRD: CPT | Performed by: INTERNAL MEDICINE

## 2023-09-13 PROCEDURE — 1160F RVW MEDS BY RX/DR IN RCRD: CPT | Performed by: INTERNAL MEDICINE

## 2023-09-13 PROCEDURE — 99214 OFFICE O/P EST MOD 30 MIN: CPT | Performed by: INTERNAL MEDICINE

## 2023-09-13 RX ORDER — KETOCONAZOLE 20 MG/G
CREAM TOPICAL
COMMUNITY
Start: 2023-08-02

## 2023-09-13 NOTE — PROGRESS NOTES
Subjective:     Encounter Date:09/13/2023      Patient ID: Emigdio Maldonado is a 83 y.o. male.    Chief Complaint and history of present illness:     Follow-up for CAD, PCI, hypertension, dyslipidemia     History of Present Illness       Mr. Emigdio Maldonado has PMH of     #. CAD, SANDEEP to RCA in Dec 2012, cath 1/18/17  severe right PDA, mid to distal diffuse LAD, distal LCX AV branch disease  #. hypertension,   #  Dyslipidemia  #.  History of vasovagal syncope  #.  CKD 3  #.  DJD,arthritis,   # hypothyroidism  #. CKD  #. Former smoker  #.  intolerant to Lipitor due to leg crampsIn the past, now tolerating Crestor okay      here for Followup.  Patient denies any chest pain, shortness of breath, palpitations.      Patient's arterial blood pressure is 118/54, heart rate 64 bpm, O2 sat of 98% on room air.         Patient had labs done 08/23/2018 which revealed normal CMP, CK, HDL low at point LDL 87 total cholesterol 155 triglycerides 140.  Repeat labs from PMD from 8/23/2019 reveals CPK which is elevated at 318, CMP with a BUN of 32 creatinine 1.16, cholesterol 147 triglycerides 137, HDL 41 LDL 79.  Labs from 7/31/2020 reveal CMP with BUN/creatinine of 41/1.24.  CPK normal at 154, cholesterol 132 triglycerides 98 HDL 42 LDL 70, normal TSH.  Labs from 8/26/2021 reveal CMP with a BUN/creatinine of 36/1.2 GFR 57, normal CK.  Cholesterol 153 triglycerides 134, HDL 41, LDL 85.  Labs from 9/9/2022 reveal CMP with a creatinine of 1.26 GFR of 54.  Labs from 7/27/2023 revealed total cholesterol 137, triglycerides 126, HDL 44, LDL 71.  CMP with a creatinine 1.24 EGFR 58.      ASSESSMENT:        #Dyslipidemia with low HDL  #CAD history of PCI  #.hypertension      PLAN:     Reviewed EKG results with patient  Reviewed labs with patient  Follow-up with nephrology.  Continue aspirin, isosorbide, metoprolol, rosuvastatin to help with hypertension, dyslipidemia, CAD history of PCI as tolerated.  Counseled on walking exercise for low  HDL.  We will check labs before next visit               ECG 12 Lead    Date/Time: 9/13/2023 11:33 AM  Performed by: Robert Rehman MD  Authorized by: Robert Rehman MD   Comparison: compared with previous ECG from 9/13/2022  Comparison to previous ECG: EKG done today reviewed/interpreted by me reveals sinus rhythm with rate of 62 bpm, no new change compared to EKG from 9/13/2020 2020        Copied text in this portion of the note has been reviewed and is accurate as of 9/21/2023  The following portions of the patient's history were reviewed and updated as appropriate: allergies, current medications, past family history, past medical history, past social history, past surgical history and problem list.    Assessment:         MDM       Diagnosis Plan   1. CAD S/P percutaneous coronary angioplasty        2. Essential hypertension        3. Dyslipidemia        4. Renal insufficiency               Plan:               Past Medical History:  Past Medical History:   Diagnosis Date    Arthritis     CAD (coronary artery disease)     Chronic kidney disease     DJD (degenerative joint disease)     Hyperlipidemia     Hypertension     Hypothyroidism      Past Surgical History:  Past Surgical History:   Procedure Laterality Date    CARDIAC CATHETERIZATION      CORONARY ANGIOPLASTY WITH STENT PLACEMENT        Allergies:  No Known Allergies  Home Meds:  Current Meds:     Current Outpatient Medications:     aspirin 81 MG tablet, ASPIRIN 81 MG ORAL TABLET, Disp: , Rfl:     Coenzyme Q10 (COQ10) 200 MG capsule, 1 capsule., Disp: , Rfl:     isosorbide mononitrate (IMDUR) 30 MG 24 hr tablet, Daily., Disp: , Rfl:     ketoconazole (NIZORAL) 2 % cream, APPLY BY TOPICAL ROUTE TWICE A DAY, Disp: , Rfl:     levothyroxine (SYNTHROID, LEVOTHROID) 100 MCG tablet, Take 88 mcg by mouth Daily., Disp: , Rfl:     metoprolol succinate XL (TOPROL-XL) 25 MG 24 hr tablet, TAKE 1 TABLET BY MOUTH EVERY DAY, Disp: 90 tablet, Rfl: 0     "ramipril (ALTACE) 2.5 MG capsule, TAKE 1 CAPSULE BY MOUTH EVERY DAY, Disp: 90 capsule, Rfl: 3    rosuvastatin (CRESTOR) 10 MG tablet, ROSUVASTATIN CALCIUM 10 MG TABS, Disp: , Rfl:     tamsulosin (FLOMAX) 0.4 MG capsule 24 hr capsule, Take 1 capsule by mouth 2 (Two) Times a Day., Disp: , Rfl:     acetaminophen (TYLENOL) 650 MG 8 hr tablet, Take 650 mg by mouth Every 8 (Eight) Hours As Needed for Mild Pain . (Patient not taking: Reported on 2023), Disp: , Rfl:     betamethasone dipropionate 0.05 % cream, Apply  topically to the appropriate area as directed 2 (Two) Times a Day. (Patient not taking: Reported on 2023), Disp: , Rfl:   Social History:   Social History     Tobacco Use    Smoking status: Former     Types: Pipe     Start date: 1968     Quit date: 1976     Years since quittin.7    Smokeless tobacco: Never   Substance Use Topics    Alcohol use: Yes      Family History:  Family History   Problem Relation Age of Onset    No Known Problems Mother     No Known Problems Father               Review of Systems   Constitutional: Negative for malaise/fatigue.   Cardiovascular:  Negative for chest pain, leg swelling and palpitations.   Respiratory:  Negative for shortness of breath.    Skin:  Negative for rash.   Neurological:  Negative for dizziness, light-headedness and numbness.   All other systems are negative         Objective:     Physical Exam  /54   Pulse 64   Ht 167.6 cm (66\")   Wt 73 kg (161 lb)   SpO2 98%   BMI 25.99 kg/m²   General:  Appears in no acute distress  Eyes: Sclera is anicteric,  conjunctiva is clear   HEENT:  No JVD.  No carotid bruits  Respiratory: Respirations regular and unlabored at rest.  Clear to auscultation  Cardiovascular: S1,S2 Regular rate and rhythm. No murmur, rub or gallop auscultated.   Extremities: No digital clubbing or cyanosis, no edema  Skin: Color pink. Skin warm and dry to touch. No rashes  No xanthoma  Neuro: Alert and awake.    Lab " "Reviewed:         Robert Remhan MD  9/21/2023 11:34 EDT      EMR Dragon/Transcription:   \"Dictated utilizing Dragon dictation\".        "

## 2023-10-02 RX ORDER — METOPROLOL SUCCINATE 25 MG/1
TABLET, EXTENDED RELEASE ORAL
Qty: 90 TABLET | Refills: 2 | Status: SHIPPED | OUTPATIENT
Start: 2023-10-02

## 2023-10-02 NOTE — TELEPHONE ENCOUNTER
Rx Refill Note  Requested Prescriptions     Pending Prescriptions Disp Refills    metoprolol succinate XL (TOPROL-XL) 25 MG 24 hr tablet [Pharmacy Med Name: METOPROLOL SUCC ER 25 MG TAB] 90 tablet 0     Sig: TAKE 1 TABLET BY MOUTH EVERY DAY      Last office visit with prescribing clinician: 9/13/2023   Last telemedicine visit with prescribing clinician: Visit date not found   Next office visit with prescribing clinician: 9/18/2024                         Would you like a call back once the refill request has been completed: [] Yes [] No    If the office needs to give you a call back, can they leave a voicemail: [] Yes [] No    Vania Long MA  10/02/23, 08:43 EDT

## 2023-12-20 RX ORDER — RAMIPRIL 2.5 MG/1
CAPSULE ORAL
Qty: 90 CAPSULE | Refills: 2 | Status: SHIPPED | OUTPATIENT
Start: 2023-12-20

## 2023-12-20 NOTE — TELEPHONE ENCOUNTER
Rx Refill Note  Requested Prescriptions     Pending Prescriptions Disp Refills    ramipril (ALTACE) 2.5 MG capsule [Pharmacy Med Name: RAMIPRIL 2.5 MG CAPSULE] 90 capsule 3     Sig: TAKE 1 CAPSULE BY MOUTH EVERY DAY      Last office visit with prescribing clinician: 9/13/2023   Last telemedicine visit with prescribing clinician: Visit date not found   Next office visit with prescribing clinician: 9/18/2024                         Would you like a call back once the refill request has been completed: [] Yes [] No    If the office needs to give you a call back, can they leave a voicemail: [] Yes [] No    Vania Long MA  12/20/23, 13:49 EST

## 2024-01-11 ENCOUNTER — TELEPHONE (OUTPATIENT)
Dept: CARDIOLOGY | Facility: CLINIC | Age: 84
End: 2024-01-11
Payer: MEDICARE

## 2024-01-11 NOTE — TELEPHONE ENCOUNTER
FACILITY: [facility]  DR: Vinicius calero  PHONE: [phone]  FAX: [phone]  PROCEDURE: HOLEP   SCHEDULED: 01/31/24  MEDS TO HOLD: ASA

## 2024-01-31 ENCOUNTER — LAB REQUISITION (OUTPATIENT)
Dept: LAB | Facility: HOSPITAL | Age: 84
End: 2024-01-31
Payer: MEDICARE

## 2024-01-31 DIAGNOSIS — N40.1 BENIGN PROSTATIC HYPERPLASIA WITH LOWER URINARY TRACT SYMPTOMS: ICD-10-CM

## 2024-01-31 PROCEDURE — 88305 TISSUE EXAM BY PATHOLOGIST: CPT | Performed by: UROLOGY

## 2024-02-01 LAB
LAB AP CASE REPORT: NORMAL
PATH REPORT.FINAL DX SPEC: NORMAL
PATH REPORT.GROSS SPEC: NORMAL

## 2024-02-07 ENCOUNTER — OFFICE VISIT (OUTPATIENT)
Dept: NEUROLOGY | Facility: CLINIC | Age: 84
End: 2024-02-07
Payer: MEDICARE

## 2024-02-07 VITALS
DIASTOLIC BLOOD PRESSURE: 67 MMHG | WEIGHT: 161 LBS | SYSTOLIC BLOOD PRESSURE: 114 MMHG | BODY MASS INDEX: 25.88 KG/M2 | HEIGHT: 66 IN | HEART RATE: 76 BPM

## 2024-02-07 DIAGNOSIS — G62.89 AXONAL SENSORIMOTOR NEUROPATHY: Primary | ICD-10-CM

## 2024-02-07 PROBLEM — E03.9 ACQUIRED HYPOTHYROIDISM: Status: ACTIVE | Noted: 2024-02-07

## 2024-02-07 PROBLEM — R97.20 ELEVATED PSA: Status: ACTIVE | Noted: 2024-02-07

## 2024-02-07 PROCEDURE — 1160F RVW MEDS BY RX/DR IN RCRD: CPT | Performed by: PSYCHIATRY & NEUROLOGY

## 2024-02-07 PROCEDURE — 3078F DIAST BP <80 MM HG: CPT | Performed by: PSYCHIATRY & NEUROLOGY

## 2024-02-07 PROCEDURE — 99204 OFFICE O/P NEW MOD 45 MIN: CPT | Performed by: PSYCHIATRY & NEUROLOGY

## 2024-02-07 PROCEDURE — 3074F SYST BP LT 130 MM HG: CPT | Performed by: PSYCHIATRY & NEUROLOGY

## 2024-02-07 PROCEDURE — 1159F MED LIST DOCD IN RCRD: CPT | Performed by: PSYCHIATRY & NEUROLOGY

## 2024-09-04 ENCOUNTER — TELEPHONE (OUTPATIENT)
Dept: CARDIOLOGY | Facility: CLINIC | Age: 84
End: 2024-09-04
Payer: MEDICARE

## 2024-09-04 DIAGNOSIS — I10 ESSENTIAL HYPERTENSION: ICD-10-CM

## 2024-09-04 DIAGNOSIS — E78.5 HYPERLIPIDEMIA, UNSPECIFIED HYPERLIPIDEMIA TYPE: Primary | ICD-10-CM

## 2024-09-04 DIAGNOSIS — Z98.61 CAD S/P PERCUTANEOUS CORONARY ANGIOPLASTY: ICD-10-CM

## 2024-09-04 DIAGNOSIS — I25.10 CAD S/P PERCUTANEOUS CORONARY ANGIOPLASTY: ICD-10-CM

## 2024-09-04 NOTE — TELEPHONE ENCOUNTER
Caller: Emigdio Maldonado    Relationship to patient: Self    Best call back number:  [phone]    Patient is needing: PATIENT CALLED TO HAVE LAB ORDERS FAXED TO [facility]. DO NOT CURRENTLY SEE ORDERS.

## 2024-09-10 ENCOUNTER — TELEPHONE (OUTPATIENT)
Dept: NEUROLOGY | Facility: CLINIC | Age: 84
End: 2024-09-10
Payer: MEDICARE

## 2024-09-10 NOTE — TELEPHONE ENCOUNTER
Caller: CALIXTO Gruber call back number: [phone]     Caller requesting test results: YES    What test was performed: BLOOD WORK    When was the test performed: 02/07/24    Where was the test performed: [facility]    Additional notes: PT CALLED AND IS WANTING TO KNOW IF HE CAN HAVE A RETURN CALL WITH TEST RESULTS AND HAVE THEM FAXED TO PCP OFFICE.    PT IS ALSO WANTING TO KNOW WHAT PROVIDER RECOMMEND PT DO FOR HIS NEUROPATHY.     PLEASE REVIEW AND ADVISE  THANK YOU

## 2024-09-18 ENCOUNTER — OFFICE VISIT (OUTPATIENT)
Dept: CARDIOLOGY | Facility: CLINIC | Age: 84
End: 2024-09-18
Payer: MEDICARE

## 2024-09-18 VITALS
SYSTOLIC BLOOD PRESSURE: 137 MMHG | DIASTOLIC BLOOD PRESSURE: 83 MMHG | WEIGHT: 168 LBS | OXYGEN SATURATION: 98 % | HEART RATE: 70 BPM | BODY MASS INDEX: 27 KG/M2 | HEIGHT: 66 IN

## 2024-09-18 DIAGNOSIS — I25.10 CAD S/P PERCUTANEOUS CORONARY ANGIOPLASTY: Primary | ICD-10-CM

## 2024-09-18 DIAGNOSIS — N18.31 STAGE 3A CHRONIC KIDNEY DISEASE: ICD-10-CM

## 2024-09-18 DIAGNOSIS — I10 ESSENTIAL HYPERTENSION: ICD-10-CM

## 2024-09-18 DIAGNOSIS — Z98.61 CAD S/P PERCUTANEOUS CORONARY ANGIOPLASTY: Primary | ICD-10-CM

## 2024-09-18 DIAGNOSIS — E78.5 DYSLIPIDEMIA: ICD-10-CM

## 2024-09-18 RX ORDER — OMEPRAZOLE 40 MG/1
1 CAPSULE, DELAYED RELEASE ORAL DAILY
COMMUNITY
Start: 2024-07-20

## 2024-09-18 RX ORDER — RAMIPRIL 5 MG/1
1 CAPSULE ORAL DAILY
COMMUNITY
Start: 2024-08-09

## 2024-09-18 RX ORDER — GLYCOPYRROLATE 1 MG/1
1 TABLET ORAL 3 TIMES DAILY
COMMUNITY
Start: 2024-07-20

## 2024-09-18 RX ORDER — LEVOTHYROXINE SODIUM 88 UG/1
1 TABLET ORAL DAILY
COMMUNITY
Start: 2024-07-20

## 2024-09-27 NOTE — PROGRESS NOTES
Chief Complaint  Peripheral Neuropathy    Subjective          Emigdio Maldonado presents to [facility] for NEUROPATHY  History of Present Illness  Patient is here to f/u on neuropathy he states it is getting worse slowly    Location both feet   Abnormal sensation in feet to ankles.     Test results reviewed no significant abnormalities to be causing neuropathy.  The vitamin D and B12 were relatively on the low side.          ===PREV. OV 2/7/24=====  New patient referred by Dr. Noble for neuropathy.  He has numbness and tingling in both feet. He also experiences some twitching in his calf muscle.  The numbness and tingling is constant. Had EMG in 2020.     First noted about 20 years ago, noted feet felt better outside of the covers.   Gradually developed numbness and tingling, now ankle on down.   Recently noted twitching in the calf   Fingers are with out symptoms.      Not painful but numbness and tingling.   Can feel temp and pain.     Balance is effected some.      Have had treatment for sciatic symptoms, exercise, massage and traction, probably right leg  about 11 or 12 years ago            ====EMG BLE 12/18/20=====  Impression:     This is an abnormal study.  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       Current Outpatient Medications:     aspirin 81 MG tablet, ASPIRIN 81 MG ORAL TABLET, Disp: , Rfl:     Coenzyme Q10 (COQ10) 200 MG capsule, 1 capsule., Disp: , Rfl:     glycopyrrolate (ROBINUL) 1 MG tablet, Take 1 tablet by mouth 3 times a day., Disp: , Rfl:     isosorbide mononitrate (IMDUR) 30 MG 24 hr tablet, 1 tablet 2 (Two) Times a Day., Disp: , Rfl:     ketoconazole (NIZORAL) 2 % cream, APPLY BY TOPICAL ROUTE TWICE A DAY, Disp: , Rfl:     levothyroxine (SYNTHROID, LEVOTHROID) 88 MCG tablet, Take 1 tablet by mouth Daily., Disp: , Rfl:     metoprolol succinate XL (TOPROL-XL) 25 MG 24 hr tablet, TAKE 1 TABLET BY MOUTH EVERY DAY, Disp: 90 tablet, Rfl: 3    omeprazole  "(priLOSEC) 40 MG capsule, Take 1 capsule by mouth Daily., Disp: , Rfl:     ramipril (ALTACE) 5 MG capsule, Take 1 capsule by mouth Daily., Disp: , Rfl:     rosuvastatin (CRESTOR) 10 MG tablet, ROSUVASTATIN CALCIUM 10 MG TABS, Disp: , Rfl:     Review of Systems   Constitutional:  Negative for fatigue.   Neurological:  Positive for numbness. Negative for light-headedness.   Psychiatric/Behavioral:  Negative for sleep disturbance.    All other systems reviewed and are negative.         Objective:    Vital Signs:   /92   Pulse 72   Ht 167.6 cm (66\")   Wt 75.8 kg (167 lb)   BMI 26.95 kg/m²     Physical Exam  Vitals reviewed.   Constitutional:       Appearance: Normal appearance.   Cardiovascular:      Pulses: Normal pulses.   Pulmonary:      Effort: Pulmonary effort is normal.   Neurological:      Mental Status: He is alert and oriented to person, place, and time.   Psychiatric:         Mood and Affect: Mood normal.        Result Review :                Neurological Exam  Mental Status  Alert. Oriented to person, place, and time.        Assessment and Plan    Diagnoses and all orders for this visit:    1. Idiopathic neuropathy (Primary)    The results of the lab studies were discussed.  No further diagnostic testing is indicated.  Taking over-the-counter B12 and vitamin D may be beneficial though his levels remain in the low normal range.  I spent 35 minutes caring for Emigdio on this date of service. This time includes time spent by me in the following activities:preparing for the visit, reviewing tests, counseling and educating the patient/family/caregiver, and documenting information in the medical record  Follow Up   Return if symptoms worsen or fail to improve.  Patient was given instructions and counseling regarding his condition or for health maintenance advice. Please see specific information pulled into the AVS if appropriate.     This document has been electronically signed by Joseph Seipel, MD on " September 30, 2024 18:26 EDT

## 2024-09-30 ENCOUNTER — OFFICE VISIT (OUTPATIENT)
Dept: NEUROLOGY | Facility: CLINIC | Age: 84
End: 2024-09-30
Payer: MEDICARE

## 2024-09-30 VITALS
DIASTOLIC BLOOD PRESSURE: 92 MMHG | HEIGHT: 66 IN | SYSTOLIC BLOOD PRESSURE: 174 MMHG | BODY MASS INDEX: 26.84 KG/M2 | HEART RATE: 72 BPM | WEIGHT: 167 LBS

## 2024-09-30 DIAGNOSIS — G60.9 IDIOPATHIC NEUROPATHY: Primary | ICD-10-CM

## 2024-09-30 PROCEDURE — 99214 OFFICE O/P EST MOD 30 MIN: CPT | Performed by: PSYCHIATRY & NEUROLOGY

## 2024-09-30 PROCEDURE — 1159F MED LIST DOCD IN RCRD: CPT | Performed by: PSYCHIATRY & NEUROLOGY

## 2024-09-30 PROCEDURE — 3077F SYST BP >= 140 MM HG: CPT | Performed by: PSYCHIATRY & NEUROLOGY

## 2024-09-30 PROCEDURE — 1160F RVW MEDS BY RX/DR IN RCRD: CPT | Performed by: PSYCHIATRY & NEUROLOGY

## 2024-09-30 PROCEDURE — 3080F DIAST BP >= 90 MM HG: CPT | Performed by: PSYCHIATRY & NEUROLOGY

## 2024-09-30 RX ORDER — METOPROLOL SUCCINATE 25 MG/1
TABLET, EXTENDED RELEASE ORAL
Qty: 90 TABLET | Refills: 3 | Status: SHIPPED | OUTPATIENT
Start: 2024-09-30

## 2024-09-30 NOTE — TELEPHONE ENCOUNTER
Rx Refill Note  Requested Prescriptions     Pending Prescriptions Disp Refills    metoprolol succinate XL (TOPROL-XL) 25 MG 24 hr tablet [Pharmacy Med Name: METOPROLOL SUCC ER 25 MG TAB] 90 tablet 2     Sig: TAKE 1 TABLET BY MOUTH EVERY DAY      Last office visit with prescribing clinician: 9/18/2024   Last telemedicine visit with prescribing clinician: Visit date not found   Next office visit with prescribing clinician: 9/17/2025                         Would you like a call back once the refill request has been completed: [] Yes [] No    If the office needs to give you a call back, can they leave a voicemail: [] Yes [] No    Vania Long MA  09/30/24, 09:46 EDT